# Patient Record
Sex: FEMALE | Race: WHITE | NOT HISPANIC OR LATINO | Employment: OTHER | URBAN - METROPOLITAN AREA
[De-identification: names, ages, dates, MRNs, and addresses within clinical notes are randomized per-mention and may not be internally consistent; named-entity substitution may affect disease eponyms.]

---

## 2023-04-10 DIAGNOSIS — I10 PRIMARY HYPERTENSION: Primary | ICD-10-CM

## 2023-04-10 RX ORDER — ASPIRIN 325 MG
TABLET ORAL
COMMUNITY
End: 2023-12-15 | Stop reason: ALTCHOICE

## 2023-04-10 RX ORDER — CALCIUM CARBONATE/VITAMIN D3 600MG-5MCG
2 TABLET ORAL DAILY
COMMUNITY

## 2023-04-10 RX ORDER — MULTIVIT,IRON,MINERALS/LUTEIN
1 TABLET ORAL DAILY
COMMUNITY

## 2023-04-10 RX ORDER — ASCORBIC ACID 500 MG
1 TABLET ORAL DAILY
COMMUNITY

## 2023-04-10 RX ORDER — LISINOPRIL 2.5 MG/1
2.5 TABLET ORAL DAILY
COMMUNITY
End: 2023-04-10 | Stop reason: SDUPTHER

## 2023-04-11 RX ORDER — LISINOPRIL 2.5 MG/1
2.5 TABLET ORAL DAILY
Qty: 90 TABLET | Refills: 1 | Status: SHIPPED | OUTPATIENT
Start: 2023-04-11 | End: 2023-10-06 | Stop reason: SDUPTHER

## 2023-10-06 DIAGNOSIS — I10 PRIMARY HYPERTENSION: ICD-10-CM

## 2023-10-06 PROBLEM — R10.31 COLICKY RLQ ABDOMINAL PAIN: Status: ACTIVE | Noted: 2023-10-06

## 2023-10-06 PROBLEM — R35.1 NOCTURIA: Status: ACTIVE | Noted: 2023-10-06

## 2023-10-06 PROBLEM — R26.2 AMBULATORY DYSFUNCTION: Status: ACTIVE | Noted: 2023-10-06

## 2023-10-06 PROBLEM — S32.010A COMPRESSION FRACTURE OF L1 VERTEBRA (MULTI): Status: ACTIVE | Noted: 2023-10-06

## 2023-10-06 PROBLEM — R00.1 BRADYCARDIA: Status: ACTIVE | Noted: 2023-10-06

## 2023-10-06 PROBLEM — R42 VERTIGO: Status: ACTIVE | Noted: 2023-10-06

## 2023-10-06 PROBLEM — L03.90 CELLULITIS: Status: ACTIVE | Noted: 2023-10-06

## 2023-10-06 PROBLEM — R60.9 EDEMA: Status: ACTIVE | Noted: 2023-10-06

## 2023-10-06 PROBLEM — R42 LIGHTHEADEDNESS: Status: ACTIVE | Noted: 2023-10-06

## 2023-10-06 PROBLEM — L57.0 ACTINIC KERATOSES: Status: ACTIVE | Noted: 2023-10-06

## 2023-10-06 PROBLEM — I35.8 AORTIC SYSTOLIC MURMUR ON EXAMINATION: Status: ACTIVE | Noted: 2023-10-06

## 2023-10-06 PROBLEM — M19.90 OSTEOARTHRITIS: Status: ACTIVE | Noted: 2023-10-06

## 2023-10-06 PROBLEM — M15.9 GENERALIZED OSTEOARTHRITIS OF MULTIPLE SITES: Status: ACTIVE | Noted: 2023-10-06

## 2023-10-06 RX ORDER — LISINOPRIL 2.5 MG/1
2.5 TABLET ORAL DAILY
Qty: 30 TABLET | Refills: 0 | Status: SHIPPED | OUTPATIENT
Start: 2023-10-06 | End: 2023-11-28 | Stop reason: SDUPTHER

## 2023-10-25 ENCOUNTER — TELEPHONE (OUTPATIENT)
Dept: PRIMARY CARE | Facility: CLINIC | Age: 88
End: 2023-10-25
Payer: MEDICARE

## 2023-10-25 NOTE — TELEPHONE ENCOUNTER
Due for [facility] wellness exam. Left message on  to call to schedule 5/31/2023

## 2023-11-16 DIAGNOSIS — I10 PRIMARY HYPERTENSION: ICD-10-CM

## 2023-11-16 RX ORDER — LISINOPRIL 2.5 MG/1
2.5 TABLET ORAL DAILY
Qty: 30 TABLET | Refills: 0 | OUTPATIENT
Start: 2023-11-16

## 2023-11-27 ENCOUNTER — TELEPHONE (OUTPATIENT)
Dept: PRIMARY CARE | Facility: CLINIC | Age: 88
End: 2023-11-27

## 2023-11-27 ENCOUNTER — APPOINTMENT (OUTPATIENT)
Dept: PRIMARY CARE | Facility: CLINIC | Age: 88
End: 2023-11-27
Payer: MEDICARE

## 2023-11-27 NOTE — TELEPHONE ENCOUNTER
Pt was scheduled with Dr Hoff today but appt canceled, Dr out ill.  She was coming in for a refill on her med as she was told the refill was denied because she hadnt been seen in over a year. She has been out over a week now.  She wants a 3 week supply called into [facility] in [place] for her lisinopril 2.5mg daily.  Can you call the patient when its approved or denied so she knows  Thank you

## 2023-11-28 DIAGNOSIS — I10 PRIMARY HYPERTENSION: ICD-10-CM

## 2023-11-28 RX ORDER — LISINOPRIL 2.5 MG/1
2.5 TABLET ORAL DAILY
Qty: 30 TABLET | Refills: 0 | Status: SHIPPED | OUTPATIENT
Start: 2023-11-28 | End: 2023-12-15 | Stop reason: SDUPTHER

## 2023-12-15 ENCOUNTER — OFFICE VISIT (OUTPATIENT)
Dept: PRIMARY CARE | Facility: CLINIC | Age: 88
End: 2023-12-15
Payer: MEDICARE

## 2023-12-15 VITALS
WEIGHT: 94.8 LBS | OXYGEN SATURATION: 99 % | TEMPERATURE: 96.5 F | RESPIRATION RATE: 14 BRPM | SYSTOLIC BLOOD PRESSURE: 124 MMHG | DIASTOLIC BLOOD PRESSURE: 86 MMHG | HEART RATE: 70 BPM | BODY MASS INDEX: 18.61 KG/M2 | HEIGHT: 60 IN

## 2023-12-15 DIAGNOSIS — D64.9 ANEMIA, UNSPECIFIED TYPE: ICD-10-CM

## 2023-12-15 DIAGNOSIS — Z00.00 WELL ADULT HEALTH CHECK: ICD-10-CM

## 2023-12-15 DIAGNOSIS — E46 PROTEIN-CALORIE MALNUTRITION, UNSPECIFIED SEVERITY (MULTI): ICD-10-CM

## 2023-12-15 DIAGNOSIS — I10 PRIMARY HYPERTENSION: ICD-10-CM

## 2023-12-15 DIAGNOSIS — M25.461 KNEE EFFUSION, RIGHT: Primary | ICD-10-CM

## 2023-12-15 PROBLEM — L03.90 CELLULITIS: Status: RESOLVED | Noted: 2023-10-06 | Resolved: 2023-12-15

## 2023-12-15 PROBLEM — L57.0 ACTINIC KERATOSES: Status: RESOLVED | Noted: 2023-10-06 | Resolved: 2023-12-15

## 2023-12-15 PROCEDURE — 3079F DIAST BP 80-89 MM HG: CPT | Performed by: FAMILY MEDICINE

## 2023-12-15 PROCEDURE — 3074F SYST BP LT 130 MM HG: CPT | Performed by: FAMILY MEDICINE

## 2023-12-15 PROCEDURE — 1160F RVW MEDS BY RX/DR IN RCRD: CPT | Performed by: FAMILY MEDICINE

## 2023-12-15 PROCEDURE — 1036F TOBACCO NON-USER: CPT | Performed by: FAMILY MEDICINE

## 2023-12-15 PROCEDURE — 1159F MED LIST DOCD IN RCRD: CPT | Performed by: FAMILY MEDICINE

## 2023-12-15 PROCEDURE — 99214 OFFICE O/P EST MOD 30 MIN: CPT | Performed by: FAMILY MEDICINE

## 2023-12-15 RX ORDER — LISINOPRIL 5 MG/1
5 TABLET ORAL DAILY
Qty: 90 TABLET | Refills: 1 | Status: SHIPPED | OUTPATIENT
Start: 2023-12-15 | End: 2024-05-20 | Stop reason: SDUPTHER

## 2023-12-15 RX ORDER — DEXTROMETHORPHAN HYDROBROMIDE, GUAIFENESIN 5; 100 MG/5ML; MG/5ML
LIQUID ORAL EVERY 8 HOURS PRN
COMMUNITY

## 2023-12-15 ASSESSMENT — ENCOUNTER SYMPTOMS
LIGHT-HEADEDNESS: 0
DIZZINESS: 0
FEVER: 0
VOMITING: 0
SHORTNESS OF BREATH: 0
NAUSEA: 0

## 2024-05-20 DIAGNOSIS — I10 PRIMARY HYPERTENSION: ICD-10-CM

## 2024-05-20 RX ORDER — LISINOPRIL 5 MG/1
5 TABLET ORAL DAILY
Qty: 90 TABLET | Refills: 0 | Status: SHIPPED | OUTPATIENT
Start: 2024-05-20 | End: 2024-05-22

## 2024-08-27 ENCOUNTER — APPOINTMENT (OUTPATIENT)
Dept: PRIMARY CARE | Facility: CLINIC | Age: 89
End: 2024-08-27
Payer: MEDICARE

## 2024-08-27 VITALS
BODY MASS INDEX: 16.52 KG/M2 | HEART RATE: 70 BPM | SYSTOLIC BLOOD PRESSURE: 152 MMHG | DIASTOLIC BLOOD PRESSURE: 92 MMHG | HEIGHT: 60 IN | WEIGHT: 84.13 LBS | TEMPERATURE: 98.7 F

## 2024-08-27 DIAGNOSIS — E46 PROTEIN-CALORIE MALNUTRITION, UNSPECIFIED SEVERITY (MULTI): ICD-10-CM

## 2024-08-27 DIAGNOSIS — I10 PRIMARY HYPERTENSION: ICD-10-CM

## 2024-08-27 DIAGNOSIS — Z00.00 ROUTINE GENERAL MEDICAL EXAMINATION AT HEALTH CARE FACILITY: ICD-10-CM
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
## 2024-08-27 DIAGNOSIS — Z00.00 WELL ADULT HEALTH CHECK: Primary | ICD-10-CM

## 2024-08-27 DIAGNOSIS — Z66 DNR (DO NOT RESUSCITATE): ICD-10-CM

## 2024-08-27 DIAGNOSIS — R26.89 BALANCE PROBLEM: ICD-10-CM

## 2024-08-27 PROCEDURE — G0439 PPPS, SUBSEQ VISIT: HCPCS | Performed by: FAMILY MEDICINE

## 2024-08-27 PROCEDURE — 1159F MED LIST DOCD IN RCRD: CPT | Performed by: FAMILY MEDICINE

## 2024-08-27 PROCEDURE — 99497 ADVNCD CARE PLAN 30 MIN: CPT | Performed by: FAMILY MEDICINE

## 2024-08-27 PROCEDURE — 3080F DIAST BP >= 90 MM HG: CPT | Performed by: FAMILY MEDICINE

## 2024-08-27 PROCEDURE — 99213 OFFICE O/P EST LOW 20 MIN: CPT | Performed by: FAMILY MEDICINE

## 2024-08-27 PROCEDURE — 1123F ACP DISCUSS/DSCN MKR DOCD: CPT | Performed by: FAMILY MEDICINE

## 2024-08-27 PROCEDURE — 3077F SYST BP >= 140 MM HG: CPT | Performed by: FAMILY MEDICINE

## 2024-08-27 PROCEDURE — 1158F ADVNC CARE PLAN TLK DOCD: CPT | Performed by: FAMILY MEDICINE

## 2024-08-27 PROCEDURE — 1170F FXNL STATUS ASSESSED: CPT | Performed by: FAMILY MEDICINE

## 2024-08-27 PROCEDURE — 1157F ADVNC CARE PLAN IN RCRD: CPT | Performed by: FAMILY MEDICINE

## 2024-08-27 RX ORDER — LISINOPRIL 10 MG/1
10 TABLET ORAL DAILY
Qty: 30 TABLET | Refills: 6 | Status: SHIPPED | OUTPATIENT
Start: 2024-08-27

## 2024-08-27 ASSESSMENT — ACTIVITIES OF DAILY LIVING (ADL)
MANAGING_FINANCES: INDEPENDENT
GROCERY_SHOPPING: INDEPENDENT
BATHING: INDEPENDENT
DRESSING: INDEPENDENT
TAKING_MEDICATION: INDEPENDENT
DOING_HOUSEWORK: INDEPENDENT

## 2024-08-27 ASSESSMENT — PATIENT HEALTH QUESTIONNAIRE - PHQ9
1. LITTLE INTEREST OR PLEASURE IN DOING THINGS: NOT AT ALL
2. FEELING DOWN, DEPRESSED OR HOPELESS: NOT AT ALL
SUM OF ALL RESPONSES TO PHQ9 QUESTIONS 1 AND 2: 0

## 2024-08-27 NOTE — PROGRESS NOTES
Subjective   Reason for Visit: Emy Mcdonald is an 89 y.o. female here for a Medicare Wellness visit.               Has  had 2 recent falls due to loss of balance.  Had a hard time getting up.  Doesn't have a life alert.    Lives at [facility].    Wants to get some PT for strengthening and her balance is bad.      She has a living will and HCPOA but she wants to be a DNRCC.  We talked about the details of this.    She is very thin and has lost weight.  Doesn't eat much per her dgt.    She declines further testing for any other more sinister cause.            Patient Care Team:  Sarah Hoff MD as PCP - General  Sarah Hoff MD as PCP - [employer] PCP     Review of Systems    Objective   Vitals:  BP (!) 152/92   Pulse 70   Temp 37.1 °C (98.7 °F)   Ht 1.524 m (5')   Wt (!) 38.2 kg (84 lb 2 oz)   BMI 16.43 kg/m²       Physical Exam  Constitutional:       Comments: Cachectic appearing.     HENT:      Head: Normocephalic and atraumatic.      Mouth/Throat:      Mouth: Mucous membranes are moist.      Pharynx: Oropharynx is clear.   Eyes:      Extraocular Movements: Extraocular movements intact.      Pupils: Pupils are equal, round, and reactive to light.   Cardiovascular:      Rate and Rhythm: Normal rate and regular rhythm.      Heart sounds: Normal heart sounds.   Pulmonary:      Effort: Pulmonary effort is normal.      Breath sounds: Normal breath sounds.   Musculoskeletal:         General: Normal range of motion.      Cervical back: Normal range of motion.   Lymphadenopathy:      Cervical: No cervical adenopathy.   Skin:     General: Skin is warm and dry.   Neurological:      General: No focal deficit present.      Mental Status: She is alert.   Psychiatric:         Mood and Affect: Mood normal.         Assessment/Plan   Problem List Items Addressed This Visit             ICD-10-CM    Primary hypertension I10     BP has been elevated.  Will increase to 10 mg  lisinopril.  Continue to monitor.           Relevant Medications    lisinopril 10 mg tablet    Protein-calorie malnutrition, unspecified severity (Multi) E46     We talked about many different ways to get protein in the diet.  Shakes, eggs, tuna, nuts, etc.  She is not very motivated to increase her caloric consumption.  Explained she needs nutrients in order to build strength with PT.            DNR (do not resuscitate) Z66     We talked about code status at length with Martita and her dgt/POA Pat for over 16 minutes.  Martita Is not wanting any further medical care or interventions.  She is ready to die when the time comes and wouldn't want any life saving treatments.  I told them to fill out a [place] DNR form and keep it in her apartment in an accessible place.  They are in agreement with this.           Relevant Orders    DNR Comfort Measures Only (Completed)    Fall W19.XXXA     Will consult [facility] for PT for strengthening and balance therapy.    We also talked about the need for life alert system at home for safety.           Relevant Orders    Referral to Home Health    Balance problem R26.89    Relevant Orders    Disability Placard     Other Visit Diagnoses         Codes    Well adult health check    -  Primary Z00.00    Relevant Orders    Follow Up In Advanced Primary Care - PCP - Medicare Annual    Routine general medical examination at health care facility     Z00.00    Relevant Orders    1 Year Follow Up In Advanced Primary Care - PCP - Wellness Exam              Advance Directives Discussion  16 - 20 minutes were spent discussing Advanced Care Planning (including a Living Will, Medical Power Of , as well as specific end of life choices and/or directives). The details of that discussion were documented in Advanced Directives Discussion section of the medical record.    Patient was identified as a fall risk. Risk prevention instructions provided.

## 2024-08-27 NOTE — PATIENT INSTRUCTIONS

## 2024-08-28 ENCOUNTER — TELEPHONE (OUTPATIENT)
Dept: PRIMARY CARE | Facility: CLINIC | Age: 89
End: 2024-08-28
Payer: MEDICARE

## 2024-08-28 NOTE — TELEPHONE ENCOUNTER
Patients daughter who is also name Martita Mcdonald called stating that where patient lives they asked her to ask patients PCP about getting a DNR form for this patient. Please review and call patients daughter in law back at [phone].

## 2024-08-29 ENCOUNTER — TELEPHONE (OUTPATIENT)
Dept: PRIMARY CARE | Facility: CLINIC | Age: 89
End: 2024-08-29
Payer: MEDICARE

## 2024-08-29 ENCOUNTER — HOME HEALTH ADMISSION (OUTPATIENT)
Dept: HOME HEALTH SERVICES | Facility: HOME HEALTH | Age: 89
End: 2024-08-29
Payer: MEDICARE

## 2024-08-29 PROBLEM — Z66 DNR (DO NOT RESUSCITATE): Status: ACTIVE | Noted: 2024-08-29

## 2024-08-29 PROBLEM — R26.89 BALANCE PROBLEM: Status: ACTIVE | Noted: 2024-08-29
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
## 2024-08-29 NOTE — ASSESSMENT & PLAN NOTE
We talked about code status at length with Pat and her dgt/POA Pat for over 16 minutes.  Pat Is not wanting any further medical care or interventions.  She is ready to die when the time comes and wouldn't want any life saving treatments.  I told them to fill out a [place] DNR form and keep it in her apartment in an accessible place.  They are in agreement with this.

## 2024-08-29 NOTE — ASSESSMENT & PLAN NOTE
We talked about many different ways to get protein in the diet.  Shakes, eggs, tuna, nuts, etc.  She is not very motivated to increase her caloric consumption.  Explained she needs nutrients in order to build strength with PT.

## 2024-08-29 NOTE — TELEPHONE ENCOUNTER
Form printed   Called Pat and she is going to stop by office to , they were visiting Pat when I called

## 2024-08-29 NOTE — ASSESSMENT & PLAN NOTE
Will consult C for PT for strengthening and balance therapy.    We also talked about the need for life alert system at home for safety.

## 2024-08-29 NOTE — TELEPHONE ENCOUNTER
Patients daughter in law came to  DNR but she asked about PT referral because when she called to schedule pt it was not in the system. Please review and place referral I can call her once its been placed.

## 2024-08-30 ENCOUNTER — HOME CARE VISIT (OUTPATIENT)
Dept: HOME HEALTH SERVICES | Facility: HOME HEALTH | Age: 89
End: 2024-08-30

## 2024-08-31 ENCOUNTER — HOME CARE VISIT (OUTPATIENT)
Dept: HOME HEALTH SERVICES | Facility: HOME HEALTH | Age: 89
End: 2024-08-31
Payer: MEDICARE

## 2024-08-31 VITALS — DIASTOLIC BLOOD PRESSURE: 60 MMHG | TEMPERATURE: 97.8 F | HEART RATE: 64 BPM | SYSTOLIC BLOOD PRESSURE: 122 MMHG

## 2024-08-31 PROCEDURE — G0151 HHCP-SERV OF PT,EA 15 MIN: HCPCS | Mod: HHH

## 2024-08-31 PROCEDURE — 169592 NO-PAY CLAIM PROCEDURE

## 2024-08-31 SDOH — HEALTH STABILITY: PHYSICAL HEALTH
EXERCISE COMMENTS: PT ATTENDS EXERCISE SESSION 5 DAYS A WEEK AT FACILITY, INSTR TO CONT , INSTR TO USE WALKER OR ROLLATOR AT ALL TIMES

## 2024-08-31 ASSESSMENT — ENCOUNTER SYMPTOMS
PAIN LOCATION - PAIN SEVERITY: 0/10
PAIN LOCATION: LEFT KNEE
PAIN: 1
HIGHEST PAIN SEVERITY IN PAST 24 HOURS: 8/10
PERSON REPORTING PAIN: PATIENT
DEPRESSION: 0
PAIN LOCATION - PAIN SEVERITY: 0/10
PAIN LOCATION: RIGHT KNEE
OCCASIONAL FEELINGS OF UNSTEADINESS: 1
LOSS OF SENSATION IN FEET: 0

## 2024-08-31 ASSESSMENT — ACTIVITIES OF DAILY LIVING (ADL)
OASIS_M1830: 03
AMBULATION_DISTANCE/DURATION_TOLERATED: 150 FT
ENTERING_EXITING_HOME: NEEDS ASSISTANCE
AMBULATION ASSISTANCE ON FLAT SURFACES: 1

## 2024-09-05 ENCOUNTER — HOME CARE VISIT (OUTPATIENT)
Dept: HOME HEALTH SERVICES | Facility: HOME HEALTH | Age: 89
End: 2024-09-05
Payer: MEDICARE

## 2024-09-05 PROCEDURE — G0157 HHC PT ASSISTANT EA 15: HCPCS | Mod: CQ,HHH

## 2024-09-05 ASSESSMENT — ENCOUNTER SYMPTOMS
HIGHEST PAIN SEVERITY IN PAST 24 HOURS: 8/10
PAIN LOCATION: LEFT KNEE
PAIN: 1
PERSON REPORTING PAIN: PATIENT
PAIN LOCATION - PAIN SEVERITY: 5/10

## 2024-09-09 ENCOUNTER — HOME CARE VISIT (OUTPATIENT)
Dept: HOME HEALTH SERVICES | Facility: HOME HEALTH | Age: 89
End: 2024-09-09
Payer: MEDICARE

## 2024-09-09 PROCEDURE — G0157 HHC PT ASSISTANT EA 15: HCPCS | Mod: CQ,HHH

## 2024-09-09 ASSESSMENT — ENCOUNTER SYMPTOMS
DENIES PAIN: 1
PERSON REPORTING PAIN: PATIENT

## 2024-09-11 ENCOUNTER — HOME CARE VISIT (OUTPATIENT)
Dept: HOME HEALTH SERVICES | Facility: HOME HEALTH | Age: 89
End: 2024-09-11
Payer: MEDICARE

## 2024-09-16 ENCOUNTER — HOME CARE VISIT (OUTPATIENT)
Dept: HOME HEALTH SERVICES | Facility: HOME HEALTH | Age: 89
End: 2024-09-16
Payer: MEDICARE

## 2024-09-18 ENCOUNTER — HOME CARE VISIT (OUTPATIENT)
Dept: HOME HEALTH SERVICES | Facility: HOME HEALTH | Age: 89
End: 2024-09-18
Payer: MEDICARE

## 2024-09-18 ASSESSMENT — ACTIVITIES OF DAILY LIVING (ADL)
OASIS_M1830: 02
HOME_HEALTH_OASIS: 01

## 2024-09-18 ASSESSMENT — PATIENT HEALTH QUESTIONNAIRE - PHQ9: PATIENT UNABLE TO COMPLETE PHQ: NON VISIT DC

## 2025-03-05 ENCOUNTER — TELEPHONE (OUTPATIENT)
Dept: PRIMARY CARE | Facility: CLINIC | Age: OVER 89
End: 2025-03-05
Payer: MEDICARE

## 2025-03-05 NOTE — TELEPHONE ENCOUNTER
Patients daughter in law whose name is also Emy calling inquiring if a urine sample order can be placed for patient and she can bring the sample to our office to be tested. She is aware that PCP is out of the office and that it is not a guarantee that covering provider will place the order. The reason why the wants to bring a sample is that patient is having a lot of confusion and there is a concern for a uti and they do not want to bring her a urgent care because patient is weak and will be evaluated for hospice tomorrow. Please review and call patients daughter in law back at [phone]. Patients daughter in law already has the kit to catch the urine in because where patient lives they gave her one.

## 2025-03-06 ENCOUNTER — OFFICE VISIT (OUTPATIENT)
Dept: URGENT CARE | Age: OVER 89
End: 2025-03-06
Payer: MEDICARE

## 2025-03-06 VITALS
HEIGHT: 60 IN | WEIGHT: 78 LBS | DIASTOLIC BLOOD PRESSURE: 87 MMHG | RESPIRATION RATE: 18 BRPM | OXYGEN SATURATION: 95 % | BODY MASS INDEX: 15.31 KG/M2 | HEART RATE: 70 BPM | SYSTOLIC BLOOD PRESSURE: 129 MMHG | TEMPERATURE: 97.5 F

## 2025-03-06 DIAGNOSIS — R82.90 URINE ABNORMALITY: ICD-10-CM

## 2025-03-06 DIAGNOSIS — N30.01 ACUTE CYSTITIS WITH HEMATURIA: Primary | ICD-10-CM

## 2025-03-06 LAB
POC APPEARANCE, URINE: ABNORMAL
POC BILIRUBIN, URINE: NEGATIVE
POC BLOOD, URINE: ABNORMAL
POC COLOR, URINE: YELLOW
POC GLUCOSE, URINE: NEGATIVE MG/DL
POC KETONES, URINE: NEGATIVE MG/DL
POC LEUKOCYTES, URINE: ABNORMAL
POC NITRITE,URINE: NEGATIVE
POC PH, URINE: 6 PH
POC PROTEIN, URINE: ABNORMAL MG/DL
POC SPECIFIC GRAVITY, URINE: 1.02
POC UROBILINOGEN, URINE: 0.2 EU/DL

## 2025-03-06 RX ORDER — SULFAMETHOXAZOLE AND TRIMETHOPRIM 800; 160 MG/1; MG/1
1 TABLET ORAL 2 TIMES DAILY
Qty: 10 TABLET | Refills: 0 | Status: SHIPPED | OUTPATIENT
Start: 2025-03-06 | End: 2025-03-11

## 2025-03-06 ASSESSMENT — ENCOUNTER SYMPTOMS
EYES NEGATIVE: 1
HEMATOLOGIC/LYMPHATIC NEGATIVE: 1
PSYCHIATRIC NEGATIVE: 1
RESPIRATORY NEGATIVE: 1
PALPITATIONS: 0
NEUROLOGICAL NEGATIVE: 1
ENDOCRINE NEGATIVE: 1
GASTROINTESTINAL NEGATIVE: 1
BACK PAIN: 0
CONSTITUTIONAL NEGATIVE: 1
MUSCULOSKELETAL NEGATIVE: 1
DYSURIA: 1
FLANK PAIN: 0
ALLERGIC/IMMUNOLOGIC NEGATIVE: 1

## 2025-03-06 NOTE — PROGRESS NOTES
Subjective   Patient ID: Emy Mcdonald is a 89 y.o. female. They present today with a chief complaint of Difficulty Urinating (Urgency at night to use restroom, odor, confusion x 2 days).    History of Present Illness    Difficulty Urinating  Associated symptoms: no flank pain        Pt presents to urgent care with c/o   increased urinary frequency, burning with urination x 2 days.  Daughter in law with pt reports increased confusion and foul smelling urine.  Pt denies CP, SOB, palpitations, fevers, abd pain, n/v/d, sick contacts, recent travel.        Past Medical History  Allergies as of 03/06/2025    (No Known Allergies)       (Not in a hospital admission)       Past Medical History:   Diagnosis Date    Other conditions influencing health status     Normal Papanicolaou smear    Personal history of other medical treatment     H/O mammogram    Procedure and treatment not carried out because of patient's decision for unspecified reasons     Colonoscopy refused       Past Surgical History:   Procedure Laterality Date    OTHER SURGICAL HISTORY  08/24/2019    Cataract surgery    OTHER SURGICAL HISTORY  08/11/2020    Hip replacement        reports that she has quit smoking. Her smoking use included cigarettes. She has never used smokeless tobacco. She reports current alcohol use of about 1.0 standard drink of alcohol per week.    Review of Systems  Review of Systems   Constitutional: Negative.    HENT: Negative.     Eyes: Negative.    Respiratory: Negative.     Cardiovascular:  Negative for chest pain and palpitations.   Gastrointestinal: Negative.    Endocrine: Negative.    Genitourinary:  Positive for dysuria and urgency. Negative for flank pain.   Musculoskeletal: Negative.  Negative for back pain.   Skin: Negative.    Allergic/Immunologic: Negative.    Neurological: Negative.    Hematological: Negative.    Psychiatric/Behavioral: Negative.     All other systems reviewed and are negative.                                  Objective    Vitals:    03/06/25 1202   BP: 129/87   BP Location: Left arm   Patient Position: Sitting   Pulse: 70   Resp: 18   Temp: 36.4 °C (97.5 °F)   SpO2: 95%   Weight: (!) 35.4 kg (78 lb)   Height: 1.524 m (5')     No LMP recorded. Patient is postmenopausal.    Physical Exam  Vitals and nursing note reviewed.   Constitutional:       General: She is not in acute distress.     Appearance: Normal appearance. She is not ill-appearing or toxic-appearing.   HENT:      Head: Atraumatic.      Mouth/Throat:      Mouth: Mucous membranes are moist.      Pharynx: Oropharynx is clear.   Eyes:      Extraocular Movements: Extraocular movements intact.      Conjunctiva/sclera: Conjunctivae normal.      Pupils: Pupils are equal, round, and reactive to light.   Cardiovascular:      Rate and Rhythm: Normal rate.   Pulmonary:      Effort: Pulmonary effort is normal.   Abdominal:      General: Abdomen is flat.      Palpations: Abdomen is soft.      Tenderness: There is no right CVA tenderness or left CVA tenderness.   Skin:     General: Skin is warm and dry.   Neurological:      General: No focal deficit present.      Mental Status: She is alert and oriented to person, place, and time.   Psychiatric:         Mood and Affect: Mood normal.         Behavior: Behavior normal.         Thought Content: Thought content normal.         Procedures    Point of Care Test & Imaging Results from this visit  Results for orders placed or performed in visit on 03/06/25   POCT UA Automated manually resulted   Result Value Ref Range    POC Color, Urine Yellow Straw, Yellow, Light-Yellow    POC Appearance, Urine Cloudy (A) Clear    POC Glucose, Urine NEGATIVE NEGATIVE mg/dl    POC Bilirubin, Urine NEGATIVE NEGATIVE    POC Ketones, Urine NEGATIVE NEGATIVE mg/dl    POC Specific Gravity, Urine 1.020 1.005 - 1.035    POC Blood, Urine TRACE-Intact (A) NEGATIVE    POC PH, Urine 6.0 No Reference Range Established PH    POC Protein, Urine 15 (1+) (A)  NEGATIVE mg/dl    POC Urobilinogen, Urine 0.2 0.2, 1.0 EU/DL    Poc Nitrite, Urine NEGATIVE NEGATIVE    POC Leukocytes, Urine SMALL (1+) (A) NEGATIVE      No results found.    Diagnostic study results (if any) were reviewed by CHIKA Jackson.    Assessment/Plan   Allergies, medications, history, and pertinent labs/EKGs/Imaging reviewed by CHIKA Jackson.     Medical Decision Making  Urgent Care Course:  Patient presents to the  with dysuria, urinary frequency.  Patient is afebrile, hemodynamically stable.  Pt denies fever, n/v, cp, sob, hematuria, rash, and diarrhea.  Abdomen is soft, non tender, non acute.  No rebound tenderness, guarding, distention, or CVA tenderness.   Patient believes that they have a UTI.  UA shows trace blood, small leukocytes.  Will send urine for culture.   Patient educated about UTIs.  Patient given prescription for bactrim, given UTI warning signs and will f/u with pcp.   Prior external records reviewed: Outpatient records  Reviewed pertinent findings from resulted labs:  UA - UTI  Independent Hx provided by:  Patient and family  Pt's case/impression summarized and discussed with:  Patient and family  Likely Dx given clinical picture:  UTI   Although not an exhaustive list of Differential Diagnosis (though considered), patient's HPI, PE, and other findings are not suggestive of:  pyelonephritis, kidney stone, urethral stricture, bladder spasms, bladder cancer   Patient at time of discharge was clinically well-appearing and HDS for outpatient management. The patient and/or family was given the opportunity to ask questions prior to discharge, understood my verbal discussion of the plans for treatment, expected course, indications to return to  or seek further treatment in ED, and the need for timely follow up as directed.    Condition: Stable  Disposition:  Discharge      Orders and Diagnoses  Diagnoses and all orders for this visit:  Acute cystitis with  hematuria  -     Urine Culture  -     sulfamethoxazole-trimethoprim (Bactrim DS) 800-160 mg tablet; Take 1 tablet by mouth 2 times a day for 5 days.  Urine abnormality  -     POCT UA Automated manually resulted  Other orders  -     Follow Up In Primary Care; Future      Medical Admin Record      Patient disposition: Home    Electronically signed by CHIKA Jackson  12:26 PM

## 2025-03-06 NOTE — TELEPHONE ENCOUNTER
Terri from [facility] calling requesting orders and any medical records such as office visit notes and medication list that can be faxed to them regarding patient to be placed in hospice. I did advise Terri that patients PCP is on vacation and that I will put this message in and that I cannot guarantee that another provider will sign off on the information. Please review.      Call back for [facility]  [phone]   Fax: [phone]

## 2025-03-07 NOTE — TELEPHONE ENCOUNTER
Per phone call from LLOYD Mcdonald ([phone]):   1) Took Pat to urgent care to address uti.  2) Aware Dr. Hoff is out of the office on vacation but asking if Dr. Herring could place an order to start hospice. Pat is declining and they want to start hospice today. Want to use [facility].  with hospice: [facility], ph: [phone]. Can order be placed?

## 2025-03-07 NOTE — TELEPHONE ENCOUNTER
Shyanne from [facility] calling inquiring about messages sent regarding order and medical records regarding patient I advised her two messages have been sent and that it is not guarantee that the covering provider will give any written or verbal order on this patient for hospice. Please review.

## 2025-03-08 LAB — BACTERIA UR CULT: NORMAL

## 2025-03-09 LAB — BACTERIA UR CULT: ABNORMAL

## 2025-04-03 ENCOUNTER — TELEPHONE (OUTPATIENT)
Dept: PRIMARY CARE | Facility: CLINIC | Age: OVER 89
End: 2025-04-03
Payer: MEDICARE

## 2025-04-03 NOTE — TELEPHONE ENCOUNTER
I received [facility]. They sent a form to us for a plan of care and want to know if you will sign it. Pt's last office visit 8/27/24. They have called mulit times asking for the form. Will you be signing the form? Mariia can be reached at [phone]

## 2025-04-11 ENCOUNTER — TELEPHONE (OUTPATIENT)
Dept: PRIMARY CARE | Facility: CLINIC | Age: OVER 89
End: 2025-04-11
Payer: MEDICARE

## 2025-04-17 ENCOUNTER — TELEPHONE (OUTPATIENT)
Dept: PRIMARY CARE | Facility: CLINIC | Age: OVER 89
End: 2025-04-17
Payer: MEDICARE

## 2025-04-17 NOTE — TELEPHONE ENCOUNTER
[facility] calling to let patients PCP know that patients death certificate is ready to be signed that they can go in and sign it online? Please review.

## 2025-08-26 ENCOUNTER — APPOINTMENT (OUTPATIENT)
Dept: PRIMARY CARE | Facility: CLINIC | Age: OVER 89
End: 2025-08-26
Payer: MEDICARE